# Patient Record
Sex: MALE | Race: BLACK OR AFRICAN AMERICAN | Employment: UNEMPLOYED | URBAN - METROPOLITAN AREA
[De-identification: names, ages, dates, MRNs, and addresses within clinical notes are randomized per-mention and may not be internally consistent; named-entity substitution may affect disease eponyms.]

---

## 2023-03-08 ENCOUNTER — HOSPITAL ENCOUNTER (EMERGENCY)
Age: 23
Discharge: HOME | End: 2023-03-09
Payer: COMMERCIAL

## 2023-03-08 DIAGNOSIS — U07.1 COVID-19: Primary | ICD-10-CM

## 2023-03-08 PROCEDURE — 99283 EMERGENCY DEPT VISIT LOW MDM: CPT

## 2023-03-09 VITALS
HEART RATE: 92 BPM | OXYGEN SATURATION: 99 % | RESPIRATION RATE: 18 BRPM | SYSTOLIC BLOOD PRESSURE: 124 MMHG | TEMPERATURE: 100.1 F | BODY MASS INDEX: 29.98 KG/M2 | DIASTOLIC BLOOD PRESSURE: 89 MMHG | WEIGHT: 221.34 LBS | HEIGHT: 72 IN

## 2023-03-09 LAB — SARS-COV-2, NAAT: DETECTED

## 2023-03-09 PROCEDURE — 6370000000 HC RX 637 (ALT 250 FOR IP): Performed by: PHYSICIAN ASSISTANT

## 2023-03-09 PROCEDURE — 87635 SARS-COV-2 COVID-19 AMP PRB: CPT

## 2023-03-09 RX ADMIN — IBUPROFEN 600 MG: 400 TABLET ORAL at 00:49

## 2023-03-09 ASSESSMENT — PAIN SCALES - GENERAL: PAINLEVEL_OUTOF10: 0

## 2023-03-09 ASSESSMENT — PAIN - FUNCTIONAL ASSESSMENT: PAIN_FUNCTIONAL_ASSESSMENT: NONE - DENIES PAIN

## 2023-03-09 ASSESSMENT — LIFESTYLE VARIABLES
HOW OFTEN DO YOU HAVE A DRINK CONTAINING ALCOHOL: NEVER
HOW MANY STANDARD DRINKS CONTAINING ALCOHOL DO YOU HAVE ON A TYPICAL DAY: PATIENT DOES NOT DRINK

## 2023-03-09 ASSESSMENT — ENCOUNTER SYMPTOMS
VOMITING: 0
COLOR CHANGE: 0
COUGH: 0
SHORTNESS OF BREATH: 0
DIARRHEA: 0
RHINORRHEA: 1
ABDOMINAL PAIN: 0

## 2023-03-09 NOTE — ED PROVIDER NOTES
[address] [facility]        Pt Name: Cookie Wagoner  MRN: [id number]  [place] 2000  Date of evaluation: 3/8/2023  Provider: ЕКАТЕРИНА Perla  PCP: No primary care provider on file. Note Started: 1:04 AM EST 3/9/23      DEMIAN. I have evaluated this patient. My supervising physician was available for consultation. CHIEF COMPLAINT       Chief Complaint   Patient presents with    Other     Patient came to ed complaint of only wanting a covid test because he took one at home and states it was positive. Patient denies any other complaints. Patient is a/o x4 at this time        HISTORY OF PRESENT ILLNESS: 1 or more Elements     History from : Patient    Limitations to history : None    Cookie Wagoner is a 26 y.o. male who presents for COVID test.  For the past 3 days he has had some congestion and fevers. He took a COVID test tonight at home which was positive. He \"wants to confirm. \"  He took some Tylenol, TheraFlu and vitamins about 2 hours ago. Denies chest pain or shortness of breath. He does not smoke. He does not have any medical problems. No known sick contacts. No abdominal pain vomiting or diarrhea. Nursing Notes were all reviewed and agreed with or any disagreements were addressed in the HPI. REVIEW OF SYSTEMS :      Review of Systems   Constitutional:  Positive for chills, fatigue and fever. HENT:  Positive for congestion and rhinorrhea. Respiratory:  Negative for cough and shortness of breath. Cardiovascular:  Negative for chest pain. Gastrointestinal:  Negative for abdominal pain, diarrhea and vomiting. Skin:  Negative for color change and wound. Neurological:  Negative for weakness. Psychiatric/Behavioral:  Negative for agitation, behavioral problems and confusion. Positives and Pertinent negatives as per HPI. SURGICAL HISTORY   No past surgical history on file.     CURRENTMEDICATIONS     There are no discharge medications for this patient. ALLERGIES     Patient has no known allergies. FAMILYHISTORY     No family history on file. SOCIAL HISTORY          SCREENINGS        [place] Coma Scale  Eye Opening: Spontaneous  Best Verbal Response: Oriented  Best Motor Response: Obeys commands  [place] Coma Scale Score: 15                CIWA Assessment  BP: 124/89  Heart Rate: 92           PHYSICAL EXAM  1 or more Elements     ED Triage Vitals [03/09/23 0001]   BP Temp Temp Source Heart Rate Resp SpO2 Height Weight   126/84 (!) 101 °F (38.3 °C) Oral 90 16 96 % 6' (1.829 m) 221 lb 5.5 oz (100.4 kg)       Physical Exam  Vitals and nursing note reviewed. Constitutional:       General: He is not in acute distress. Appearance: Normal appearance. He is not ill-appearing. HENT:      Head: Normocephalic and atraumatic. Right Ear: Tympanic membrane normal.      Left Ear: Tympanic membrane normal.      Mouth/Throat:      Mouth: Mucous membranes are moist.      Pharynx: Oropharynx is clear. No oropharyngeal exudate or posterior oropharyngeal erythema. Eyes:      Pupils: Pupils are equal, round, and reactive to light. Cardiovascular:      Rate and Rhythm: Normal rate. Pulmonary:      Effort: Pulmonary effort is normal. No respiratory distress. Abdominal:      Tenderness: There is no abdominal tenderness. There is no guarding. Musculoskeletal:         General: Normal range of motion. Cervical back: Normal range of motion. Skin:     General: Skin is warm. Neurological:      General: No focal deficit present. Mental Status: He is alert and oriented to person, place, and time.    Psychiatric:         Mood and Affect: Mood normal.         Behavior: Behavior normal.       DIAGNOSTIC RESULTS   LABS:    Labs Reviewed   COVID-19, RAPID - Abnormal; Notable for the following components:       Result Value    SARS-CoV-2, NAAT DETECTED (*)     All other components within normal limits       When ordered only abnormal lab results are displayed. All other labs were within normal range or not returned as of this dictation. EKG: When ordered, EKG's are interpreted by the Emergency Department Physician in the absence of a cardiologist.  Please see their note for interpretation of EKG. RADIOLOGY:   Non-plain film images such as CT, Ultrasound and MRI are read by the radiologist. Plain radiographic images are visualized and preliminarily interpreted by the ED Provider with the below findings:    Interpretation per the Radiologist below, if available at the time of this note:    No orders to display     No results found. No results found. PROCEDURES   Unless otherwise noted below, none     Procedures    CRITICAL CARE TIME (.cctime)   I performed a total Critical Care time of 0 minutes, excluding separately reportable procedures. There was a high probability of clinically significant/life threatening deterioration in the patient's condition which required my urgent intervention. Not limited to multiple reexaminations, discussions with attending physician and consultants. PAST MEDICAL HISTORY      has no past medical history on file. EMERGENCY DEPARTMENT COURSE and DIFFERENTIAL DIAGNOSIS/MDM:   Vitals:    Vitals:    03/09/23 0001 03/09/23 0049 03/09/23 0059   BP: 126/84 124/89    Pulse: 90 92    Resp: 16 18 18   Temp: (!) 101 °F (38.3 °C) 100.1 °F (37.8 °C)    TempSrc: Oral Oral    SpO2: 96% 98% 99%   Weight: 221 lb 5.5 oz (100.4 kg)     Height: 6' (1.829 m)         Patient was given the following medications:  Medications   ibuprofen (ADVIL;MOTRIN) tablet 600 mg (600 mg Oral Given 3/9/23 0049)             Is this patient to be included in the SEP-1 Core Measure due to severe sepsis or septic shock?    No   Exclusion criteria - the patient is NOT to be included for SEP-1 Core Measure due to:  Viral etiology found or highly suspected (including COVID-19) without concomitant bacterial infection    CONSULTS: (Who and What was discussed)  None  Discussion with Other Profesionals : None    Social Determinants : None    Records Reviewed : None    CC/HPI Summary, DDx, ED Course, and Reassessment: Resting COVID test.  He is positive. His fever came down. He is instructed to take Tylenol ibuprofen. He is not hypoxic. He has no underlying medical history. He does not smoke. Return for new or worsening including shortness of breath chest pain abdominal pain vomiting or diarrhea. I am the Primary Clinician of Record. FINAL IMPRESSION      1. COVID-19          DISPOSITION/PLAN     DISPOSITION Decision To Discharge 03/09/2023 12:49:49 AM      PATIENT REFERRED TO:  [facility]) Pre-Services  [phone]          DISCHARGE MEDICATIONS:  There are no discharge medications for this patient. DISCONTINUED MEDICATIONS:  There are no discharge medications for this patient.              (Please note that portions of this note were completed with a voice recognition program.  Efforts were made to edit the dictations but occasionally words are mis-transcribed.)    ЕКАТЕРИНА Escobar (electronically signed)            ЕКАТЕРИНА Escobar  03/09/23 8559

## 2023-03-09 NOTE — ED NOTES
.Pt discharged at this time. Discharge instructions and medications reviewed,  Questions were answered. PT verbalized understanding. VSS, Afebrile. Follow up appointments were discussed.          Cecilio Batista RN  03/09/23 2167